# Patient Record
Sex: FEMALE | Race: WHITE | NOT HISPANIC OR LATINO | Employment: FULL TIME | URBAN - METROPOLITAN AREA
[De-identification: names, ages, dates, MRNs, and addresses within clinical notes are randomized per-mention and may not be internally consistent; named-entity substitution may affect disease eponyms.]

---

## 2018-03-14 ENCOUNTER — OFFICE VISIT (OUTPATIENT)
Dept: FAMILY MEDICINE CLINIC | Facility: CLINIC | Age: 28
End: 2018-03-14
Payer: COMMERCIAL

## 2018-03-14 VITALS
WEIGHT: 182 LBS | SYSTOLIC BLOOD PRESSURE: 124 MMHG | OXYGEN SATURATION: 98 % | RESPIRATION RATE: 16 BRPM | BODY MASS INDEX: 24.65 KG/M2 | DIASTOLIC BLOOD PRESSURE: 78 MMHG | HEIGHT: 72 IN | HEART RATE: 91 BPM

## 2018-03-14 DIAGNOSIS — H00.011 HORDEOLUM EXTERNUM OF RIGHT UPPER EYELID: ICD-10-CM

## 2018-03-14 DIAGNOSIS — M54.50 ACUTE RIGHT-SIDED LOW BACK PAIN WITHOUT SCIATICA: ICD-10-CM

## 2018-03-14 DIAGNOSIS — Z00.00 PREVENTATIVE HEALTH CARE: Primary | ICD-10-CM

## 2018-03-14 DIAGNOSIS — B00.9 HSV-2 INFECTION: ICD-10-CM

## 2018-03-14 PROCEDURE — 86580 TB INTRADERMAL TEST: CPT

## 2018-03-14 PROCEDURE — 99385 PREV VISIT NEW AGE 18-39: CPT | Performed by: FAMILY MEDICINE

## 2018-03-14 PROCEDURE — 99203 OFFICE O/P NEW LOW 30 MIN: CPT | Performed by: FAMILY MEDICINE

## 2018-03-14 RX ORDER — NAPROXEN 500 MG/1
500 TABLET ORAL EVERY 12 HOURS PRN
Refills: 0 | COMMUNITY
Start: 2018-01-25 | End: 2018-04-13 | Stop reason: ALTCHOICE

## 2018-03-14 RX ORDER — MELOXICAM 7.5 MG/1
7.5 TABLET ORAL DAILY
Refills: 0 | COMMUNITY
Start: 2018-02-12 | End: 2018-04-13 | Stop reason: ALTCHOICE

## 2018-03-14 RX ORDER — MOXIFLOXACIN 5 MG/ML
1 SOLUTION/ DROPS OPHTHALMIC 2 TIMES DAILY
Qty: 3 ML | Refills: 0 | Status: SHIPPED | OUTPATIENT
Start: 2018-03-14 | End: 2018-04-13 | Stop reason: ALTCHOICE

## 2018-03-14 RX ORDER — VALACYCLOVIR HYDROCHLORIDE 500 MG/1
500 TABLET, FILM COATED ORAL 2 TIMES DAILY
Qty: 30 TABLET | Refills: 0
Start: 2018-03-14 | End: 2018-08-28 | Stop reason: SDUPTHER

## 2018-03-14 RX ORDER — CELECOXIB 200 MG/1
200 CAPSULE ORAL 2 TIMES DAILY
Refills: 0 | COMMUNITY
Start: 2018-02-16 | End: 2018-04-13 | Stop reason: ALTCHOICE

## 2018-03-14 RX ORDER — CYCLOBENZAPRINE HCL 5 MG
TABLET ORAL
Refills: 0 | COMMUNITY
Start: 2018-02-16 | End: 2018-04-13

## 2018-03-14 NOTE — PROGRESS NOTES
Assessment/Plan:  Patient advised basic labs  She will call us back if she gets an active outbreak of HSV  Patient was provided a referral for physical therapy  She will contact her insurance regarding workmen's Comp  Diagnoses and all orders for this visit:    Preventative health care  -     TB Skin Test    Acute right-sided low back pain without sciatica  -     Ambulatory referral to Physical Therapy; Future    Hordeolum externum of right upper eyelid  -     moxifloxacin (VIGAMOX) 0 5 % ophthalmic solution; Administer 1 drop to the right eye 2 (two) times a day    HSV-2 infection  -     CBC and differential  -     Comprehensive metabolic panel  -     valACYclovir (VALTREX) 500 mg tablet; Take 1 tablet (500 mg total) by mouth 2 (two) times a day for 31 days  -     HSV 1/2 IgM and Type Specific IgG; Future          Subjective:      Patient ID: Anthony Kaplan is a 28 y o  female  Prior PCP: In [place]  Previous Dental Visit: Recent  Previous Eye Exam: recent    Prior Vaccines:   - Last Flu vaccine: 2017  - Last Tetanus vaccine: 2013    Diet: healthy  Exercise: Daily    LMP: 3/1/18  Duration: 4 days  Menarche: 12 yrs    Cancer screening  Last Pap: 6 months ago  Last Mammo: NA  Colonoscopy NA     HPI   Patient is here to establish care  She has recently moved from [place] and needs a physical for her job  Patient is requesting PPD vaccine today  She had a Fall on  1/25/18,  When she slipped on water  According to patient this was an 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  Patient states that she was evaluated by her PCP in [place] and advised physical therapy  Patient is requesting a referral for physical therapy today  Patient states that this is linked to her workmen's Comp so she will call the insurance to check with details of physical therapy  Patient states that she continues to experience pain in the right lower back   She also reports history of chronic at chest pain infection for which she has been on 500 milligram Valtrex maintenance dose  Patient states that whenever she gets the infection she has to take  Medication for at least a month  Patient states that she does not have any active infection  she is also reporting stye  Of her right eye  Patient states that she has used moxifloxacin  Eyedrops in the past for this and is requesting a prescription for same  The following portions of the patient's history were reviewed and updated as appropriate: allergies, current medications, past family history, past medical history, past social history, past surgical history and problem list     Review of Systems   Constitutional: Negative  HENT: Negative  Eyes: Positive for pain (Rt eye)  Respiratory: Negative  Cardiovascular: Negative  Gastrointestinal: Negative  Endocrine: Negative  Genitourinary: Negative  Musculoskeletal: Positive for back pain (  Right low back pain)  Skin: Negative  Neurological: Negative  Psychiatric/Behavioral: Negative  Social History     Social History    Marital status: /Civil Union     Spouse name: N/A    Number of children: N/A    Years of education: N/A     Occupational History    Not on file  Social History Main Topics    Smoking status: Never Smoker    Smokeless tobacco: Never Used    Alcohol use Yes    Drug use: Unknown    Sexual activity: Not on file     Other Topics Concern    Not on file     Social History Narrative    No narrative on file         Objective:  /78 (BP Location: Right arm, Patient Position: Sitting, Cuff Size: Standard)   Pulse 91   Resp 16   Ht 6' 7" (2 007 m)   Wt 82 6 kg (182 lb)   SpO2 98%   BMI 20 50 kg/m²      Physical Exam   Constitutional: She is oriented to person, place, and time  She appears well-developed and well-nourished  HENT:   Head: Normocephalic     Right Ear: External ear normal    Left Ear: External ear normal    Eyes: Pupils are equal, round, and reactive to light  Stye of right upper lid  Neck: Normal range of motion  Neck supple  Cardiovascular: Normal rate and regular rhythm  Pulmonary/Chest: Effort normal and breath sounds normal    Abdominal: Soft  Bowel sounds are normal    Musculoskeletal: Normal range of motion  Neurological: She is alert and oriented to person, place, and time  Psychiatric: She has a normal mood and affect   Her behavior is normal  Judgment normal

## 2018-03-16 ENCOUNTER — APPOINTMENT (OUTPATIENT)
Dept: LAB | Facility: CLINIC | Age: 28
End: 2018-03-16
Payer: COMMERCIAL

## 2018-03-16 DIAGNOSIS — B00.9 HSV-2 INFECTION: ICD-10-CM

## 2018-03-16 LAB
ALBUMIN SERPL BCP-MCNC: 3.9 G/DL (ref 3.5–5)
ALP SERPL-CCNC: 45 U/L (ref 46–116)
ALT SERPL W P-5'-P-CCNC: 20 U/L (ref 12–78)
ANION GAP SERPL CALCULATED.3IONS-SCNC: 7 MMOL/L (ref 4–13)
AST SERPL W P-5'-P-CCNC: 13 U/L (ref 5–45)
BASOPHILS # BLD AUTO: 0.07 THOUSANDS/ΜL (ref 0–0.1)
BASOPHILS NFR BLD AUTO: 2 % (ref 0–1)
BILIRUB SERPL-MCNC: 0.65 MG/DL (ref 0.2–1)
BUN SERPL-MCNC: 11 MG/DL (ref 5–25)
CALCIUM SERPL-MCNC: 8.9 MG/DL (ref 8.3–10.1)
CHLORIDE SERPL-SCNC: 103 MMOL/L (ref 100–108)
CO2 SERPL-SCNC: 28 MMOL/L (ref 21–32)
CREAT SERPL-MCNC: 0.75 MG/DL (ref 0.6–1.3)
EOSINOPHIL # BLD AUTO: 0.18 THOUSAND/ΜL (ref 0–0.61)
EOSINOPHIL NFR BLD AUTO: 4 % (ref 0–6)
ERYTHROCYTE [DISTWIDTH] IN BLOOD BY AUTOMATED COUNT: 12.4 % (ref 11.6–15.1)
GFR SERPL CREATININE-BSD FRML MDRD: 109 ML/MIN/1.73SQ M
GLUCOSE P FAST SERPL-MCNC: 88 MG/DL (ref 65–99)
HCT VFR BLD AUTO: 38.3 % (ref 34.8–46.1)
HGB BLD-MCNC: 13 G/DL (ref 11.5–15.4)
LYMPHOCYTES # BLD AUTO: 1.4 THOUSANDS/ΜL (ref 0.6–4.47)
LYMPHOCYTES NFR BLD AUTO: 32 % (ref 14–44)
MCH RBC QN AUTO: 29.9 PG (ref 26.8–34.3)
MCHC RBC AUTO-ENTMCNC: 33.9 G/DL (ref 31.4–37.4)
MCV RBC AUTO: 88 FL (ref 82–98)
MONOCYTES # BLD AUTO: 0.26 THOUSAND/ΜL (ref 0.17–1.22)
MONOCYTES NFR BLD AUTO: 6 % (ref 4–12)
NEUTROPHILS # BLD AUTO: 2.46 THOUSANDS/ΜL (ref 1.85–7.62)
NEUTS SEG NFR BLD AUTO: 56 % (ref 43–75)
NRBC BLD AUTO-RTO: 0 /100 WBCS
PLATELET # BLD AUTO: 225 THOUSANDS/UL (ref 149–390)
PMV BLD AUTO: 10.9 FL (ref 8.9–12.7)
POTASSIUM SERPL-SCNC: 4.1 MMOL/L (ref 3.5–5.3)
PROT SERPL-MCNC: 7.2 G/DL (ref 6.4–8.2)
RBC # BLD AUTO: 4.35 MILLION/UL (ref 3.81–5.12)
SODIUM SERPL-SCNC: 138 MMOL/L (ref 136–145)
WBC # BLD AUTO: 4.38 THOUSAND/UL (ref 4.31–10.16)

## 2018-03-16 PROCEDURE — 86695 HERPES SIMPLEX TYPE 1 TEST: CPT

## 2018-03-16 PROCEDURE — 86696 HERPES SIMPLEX TYPE 2 TEST: CPT

## 2018-03-16 PROCEDURE — 36415 COLL VENOUS BLD VENIPUNCTURE: CPT | Performed by: FAMILY MEDICINE

## 2018-03-16 PROCEDURE — 80053 COMPREHEN METABOLIC PANEL: CPT | Performed by: FAMILY MEDICINE

## 2018-03-16 PROCEDURE — 85025 COMPLETE CBC W/AUTO DIFF WBC: CPT | Performed by: FAMILY MEDICINE

## 2018-03-17 LAB
HSV1 IGG SER IA-ACNC: <0.91 INDEX (ref 0–0.9)
HSV2 IGG SER IA-ACNC: 9.22 INDEX (ref 0–0.9)

## 2018-03-19 LAB
HSV1 IGM TITR SER IF: NORMAL TITER
HSV2 IGM TITR SER IF: NORMAL TITER

## 2018-04-11 ENCOUNTER — TELEPHONE (OUTPATIENT)
Dept: FAMILY MEDICINE CLINIC | Facility: CLINIC | Age: 28
End: 2018-04-11

## 2018-04-11 NOTE — TELEPHONE ENCOUNTER
[facility] CALLED AND IS REQUESTING AN UPDATE ORDER FOR LUIS FERNANDO FOR LOWER BACK    PLEASE FAX -[phone]

## 2018-04-12 DIAGNOSIS — M54.50 ACUTE RIGHT-SIDED LOW BACK PAIN WITHOUT SCIATICA: Primary | ICD-10-CM

## 2018-04-13 ENCOUNTER — OFFICE VISIT (OUTPATIENT)
Dept: FAMILY MEDICINE CLINIC | Facility: CLINIC | Age: 28
End: 2018-04-13
Payer: OTHER MISCELLANEOUS

## 2018-04-13 VITALS
WEIGHT: 181 LBS | BODY MASS INDEX: 28.41 KG/M2 | RESPIRATION RATE: 16 BRPM | DIASTOLIC BLOOD PRESSURE: 76 MMHG | HEART RATE: 82 BPM | HEIGHT: 67 IN | OXYGEN SATURATION: 98 % | SYSTOLIC BLOOD PRESSURE: 122 MMHG

## 2018-04-13 DIAGNOSIS — M54.42 CHRONIC BILATERAL LOW BACK PAIN WITH LEFT-SIDED SCIATICA: Primary | ICD-10-CM

## 2018-04-13 DIAGNOSIS — M62.838 MUSCLE SPASM: ICD-10-CM

## 2018-04-13 DIAGNOSIS — G89.29 CHRONIC BILATERAL LOW BACK PAIN WITH LEFT-SIDED SCIATICA: Primary | ICD-10-CM

## 2018-04-13 PROBLEM — M54.50 CHRONIC BILATERAL LOW BACK PAIN WITHOUT SCIATICA: Status: ACTIVE | Noted: 2018-04-13

## 2018-04-13 PROCEDURE — 99214 OFFICE O/P EST MOD 30 MIN: CPT | Performed by: FAMILY MEDICINE

## 2018-04-13 RX ORDER — PREDNISONE 20 MG/1
60 TABLET ORAL DAILY
Qty: 15 TABLET | Refills: 0 | Status: SHIPPED | OUTPATIENT
Start: 2018-04-13 | End: 2018-04-18

## 2018-04-13 RX ORDER — CYCLOBENZAPRINE HCL 10 MG
10 TABLET ORAL 3 TIMES DAILY PRN
Qty: 30 TABLET | Refills: 0 | Status: SHIPPED | OUTPATIENT
Start: 2018-04-13 | End: 2018-05-04

## 2018-04-13 NOTE — PROGRESS NOTES
Subjective:      Patient ID: Kyra Ba is a 28 y o  female  Back Pain   This is a chronic problem  The current episode started in the past 7 days  The problem occurs constantly  The problem has been gradually worsening since onset  The pain is present in the lumbar spine and sacro-iliac  The quality of the pain is described as aching, cramping and shooting  The pain radiates to the right thigh and left thigh  The pain is at a severity of 7/10  The pain is moderate  The pain is the same all the time  The symptoms are aggravated by bending, lying down, position, sitting, standing and twisting  Stiffness is present all day  Associated symptoms include leg pain  Pertinent negatives include no bladder incontinence, bowel incontinence, dysuria, fever, numbness, paresis, paresthesias, pelvic pain, tingling or weakness  Patient's symptoms have been going on since of Fall  in Jan 25 2018, Accidental slip on wet floor, at work, in [place]  Pt involved in [employer] which is in [place]  She moved to area in march 2018  Has been doing Physical [facility]  Patient was at her therapy session yesterday for evaluation for [email or web address]  After the physical therapy she experienced worsening low back pain,  Which is now radiating up to the middle of both her thighs  No past medical history on file  No family history on file  No past surgical history on file  reports that she has never smoked  She has never used smokeless tobacco  She reports that she drinks alcohol        Current Outpatient Prescriptions:     valACYclovir (VALTREX) 500 mg tablet, Take 1 tablet (500 mg total) by mouth 2 (two) times a day for 31 days, Disp: 30 tablet, Rfl: 0    cyclobenzaprine (FLEXERIL) 10 mg tablet, Take 1 tablet (10 mg total) by mouth 3 (three) times a day as needed for muscle spasms, Disp: 30 tablet, Rfl: 0    diclofenac sodium (VOLTAREN) 50 mg EC tablet, Take 1 tablet (50 mg total) by mouth 2 (two) times a day, Disp: 60 tablet, Rfl: 0    predniSONE 20 mg tablet, Take 3 tablets (60 mg total) by mouth daily for 5 days, Disp: 15 tablet, Rfl: 0    The following portions of the patient's history were reviewed and updated as appropriate: allergies, current medications, past family history, past medical history, past social history, past surgical history and problem list     Review of Systems   Constitutional: Negative for fever  Gastrointestinal: Negative for bowel incontinence  Genitourinary: Negative for bladder incontinence, dysuria and pelvic pain  Musculoskeletal: Positive for back pain  Negative for arthralgias and gait problem  Neurological: Negative for tingling, weakness, numbness and paresthesias  Objective:    /76   Pulse 82   Resp 16   Ht 5' 7" (1 702 m)   Wt 82 1 kg (181 lb)   LMP 04/01/2018   SpO2 98%   BMI 28 35 kg/m²      Physical Exam   Constitutional: She is oriented to person, place, and time  She appears well-developed and well-nourished  Cardiovascular: Normal rate, regular rhythm and normal heart sounds  Pulmonary/Chest: Effort normal and breath sounds normal    Abdominal: Soft  Bowel sounds are normal    Musculoskeletal: She exhibits tenderness ( over right Lumbar paraspinal region  )  Neurological: She is alert and oriented to person, place, and time  She has normal reflexes  She displays normal reflexes  No cranial nerve deficit  She exhibits abnormal muscle tone   Coordination normal    Psychiatric: Her behavior is normal  Judgment normal          Recent Results (from the past 1008 hour(s))   CBC and differential    Collection Time: 03/16/18  9:29 AM   Result Value Ref Range    WBC 4 38 4 31 - 10 16 Thousand/uL    RBC 4 35 3 81 - 5 12 Million/uL    Hemoglobin 13 0 11 5 - 15 4 g/dL    Hematocrit 38 3 34 8 - 46 1 %    MCV 88 82 - 98 fL    MCH 29 9 26 8 - 34 3 pg    MCHC 33 9 31 4 - 37 4 g/dL    RDW 12 4 11 6 - 15 1 %    MPV 10 9 8 9 - 12 7 fL    Platelets 634 298 - 750 Thousands/uL    nRBC 0 /100 WBCs    Neutrophils Relative 56 43 - 75 %    Lymphocytes Relative 32 14 - 44 %    Monocytes Relative 6 4 - 12 %    Eosinophils Relative 4 0 - 6 %    Basophils Relative 2 (H) 0 - 1 %    Neutrophils Absolute 2 46 1 85 - 7 62 Thousands/µL    Lymphocytes Absolute 1 40 0 60 - 4 47 Thousands/µL    Monocytes Absolute 0 26 0 17 - 1 22 Thousand/µL    Eosinophils Absolute 0 18 0 00 - 0 61 Thousand/µL    Basophils Absolute 0 07 0 00 - 0 10 Thousands/µL   Comprehensive metabolic panel    Collection Time: 03/16/18  9:29 AM   Result Value Ref Range    Sodium 138 136 - 145 mmol/L    Potassium 4 1 3 5 - 5 3 mmol/L    Chloride 103 100 - 108 mmol/L    CO2 28 21 - 32 mmol/L    Anion Gap 7 4 - 13 mmol/L    BUN 11 5 - 25 mg/dL    Creatinine 0 75 0 60 - 1 30 mg/dL    Glucose, Fasting 88 65 - 99 mg/dL    Calcium 8 9 8 3 - 10 1 mg/dL    AST 13 5 - 45 U/L    ALT 20 12 - 78 U/L    Alkaline Phosphatase 45 (L) 46 - 116 U/L    Total Protein 7 2 6 4 - 8 2 g/dL    Albumin 3 9 3 5 - 5 0 g/dL    Total Bilirubin 0 65 0 20 - 1 00 mg/dL    eGFR 109 ml/min/1 73sq m   Herpes I /II IgM Ab Indirect    Collection Time: 03/16/18  6:32 PM   Result Value Ref Range    HSV 1 IgM <1:10 <1:10 titer    HSV 2 IgM <1:10 <1:10 titer   Herpes I/II IgG Antibodies    Collection Time: 03/16/18  6:32 PM   Result Value Ref Range    HSV 1 IgG <0 91 0 00 - 0 90 index    HSV 2 IgG 9 22 (H) 0 00 - 0 90 index       Assessment/Plan:   worsening low back pain with intense muscular spasm of the right paraspinal lumbar region  According to patient symptoms worsened after she was evaluated by Physical therapy  Patient states that this chills symptoms have been going on for a long time,  And this is affecting her in many ways  Patient started on anti-inflammatory and muscle relaxants  She was provided a referral for physical therapy and also for pain management     Diagnoses and all orders for this visit:    Chronic bilateral low back pain with left-sided sciatica  -     diclofenac sodium (VOLTAREN) 50 mg EC tablet; Take 1 tablet (50 mg total) by mouth 2 (two) times a day  -     cyclobenzaprine (FLEXERIL) 10 mg tablet; Take 1 tablet (10 mg total) by mouth 3 (three) times a day as needed for muscle spasms  -     Ambulatory referral to Pain Management; Future  -     predniSONE 20 mg tablet; Take 3 tablets (60 mg total) by mouth daily for 5 days  -     Ambulatory referral to Physical Therapy; Future    Muscle spasm  -     diclofenac sodium (VOLTAREN) 50 mg EC tablet; Take 1 tablet (50 mg total) by mouth 2 (two) times a day  -     cyclobenzaprine (FLEXERIL) 10 mg tablet; Take 1 tablet (10 mg total) by mouth 3 (three) times a day as needed for muscle spasms  -     Ambulatory referral to Pain Management; Future  -     predniSONE 20 mg tablet; Take 3 tablets (60 mg total) by mouth daily for 5 days  -     Ambulatory referral to Physical Therapy;  Future

## 2018-04-17 ENCOUNTER — TELEPHONE (OUTPATIENT)
Dept: FAMILY MEDICINE CLINIC | Facility: CLINIC | Age: 28
End: 2018-04-17

## 2018-04-17 NOTE — TELEPHONE ENCOUNTER
Physician with Patient's workmen Comp called denying physical therapy which was ordered for patient at her most recent office visit  According to the  Physician patient was approved for are 12 physical therapy sessions for the fall that she sustained at work in [place]  She has completed 10 sessions already  He reviewed physical therapy notes and does not feel necessary for her to do 12 more physical therapy sessions at this point of time  I did make him aware that patient has recently moved to this area and when she went for physical therapy due to manipulation that therapy she experienced acute muscle spasm of the lower back  She was treated for her symptoms and advised physical therapy if the symptoms persist or worsen  The insurance provider however feels that at this point if she is not improving from therapy that she should do home exercises for her symptoms  I have referred the patient to consult pain specialist to see if there is something that needs to be done besides physical therapy for her ongoing symptoms  Please call the patient and  Inform her

## 2018-05-04 ENCOUNTER — OFFICE VISIT (OUTPATIENT)
Dept: FAMILY MEDICINE CLINIC | Facility: CLINIC | Age: 28
End: 2018-05-04
Payer: COMMERCIAL

## 2018-05-04 VITALS
OXYGEN SATURATION: 98 % | SYSTOLIC BLOOD PRESSURE: 120 MMHG | DIASTOLIC BLOOD PRESSURE: 72 MMHG | RESPIRATION RATE: 18 BRPM | BODY MASS INDEX: 29.51 KG/M2 | HEIGHT: 67 IN | TEMPERATURE: 98.4 F | WEIGHT: 188 LBS | HEART RATE: 94 BPM

## 2018-05-04 DIAGNOSIS — L01.00 IMPETIGO: Primary | ICD-10-CM

## 2018-05-04 PROCEDURE — 1036F TOBACCO NON-USER: CPT | Performed by: PHYSICIAN ASSISTANT

## 2018-05-04 PROCEDURE — 99213 OFFICE O/P EST LOW 20 MIN: CPT | Performed by: PHYSICIAN ASSISTANT

## 2018-05-04 RX ORDER — MELOXICAM 15 MG/1
TABLET ORAL
COMMUNITY
Start: 2018-04-27 | End: 2018-12-12

## 2018-05-04 RX ORDER — CEPHALEXIN 250 MG/1
250 CAPSULE ORAL 4 TIMES DAILY
Qty: 40 CAPSULE | Refills: 0 | Status: SHIPPED | OUTPATIENT
Start: 2018-05-04 | End: 2018-05-14

## 2018-05-04 RX ORDER — TRAMADOL HYDROCHLORIDE 50 MG/1
TABLET ORAL
COMMUNITY
End: 2018-12-12

## 2018-05-04 NOTE — PROGRESS NOTES
Assessment/Plan:     Diagnoses and all orders for this visit:    Impetigo  Honey colored crusting, with limited spread on forehead Spares periorbital region   - Started on mupirocin, Apply topically 3 (three) times a day  - FU in 3 days if no improvement  - Directed to clean minor injuries with soap and water and regular handwashing          Subjective:    Patient ID: Chalo Sandy is a 28 y o  female  Pt is presenting today for Itchy crusting forehead rash starting 7 days ago  Initially she attempted to "pop a zit" and the it because red and started to ooze a yellowish crust  In the past 3 days it has started to spread up her forehead  She has been using antibiotic cream with on impact  She has a history of impetigo infection on her chin 10 years ago  She states it looks very similar and was treated with antibiotic cream and oral antibiotics  Denies fever, chills, blurry vision or headache  She has some seasonal allergies and occasionally uses Claritin for relief  Rash   This is a new problem  The current episode started in the past 7 days  The problem has been gradually worsening since onset  The affected locations include the face  The rash is characterized by itchiness and pain  She was exposed to nothing  Pertinent negatives include no congestion, cough, diarrhea, eye pain, facial edema, fatigue, fever, rhinorrhea, shortness of breath or sore throat  Past treatments include antibiotic cream  The treatment provided no relief  There is no history of allergies, asthma, eczema or varicella  The following portions of the patient's history were reviewed and updated as appropriate: allergies, current medications and problem list     Review of Systems   Constitutional: Negative for fatigue, fever and unexpected weight change  HENT: Negative for congestion, rhinorrhea and sore throat  Eyes: Negative for pain  Respiratory: Negative for cough, shortness of breath and wheezing  Cardiovascular: Negative for chest pain, palpitations and leg swelling  Gastrointestinal: Negative for constipation, diarrhea and nausea  Musculoskeletal: Negative for arthralgias and myalgias  Skin: Positive for rash  Negative for pallor  Objective:  /72   Pulse 94   Temp 98 4 °F (36 9 °C)   Resp 18   Ht 5' 7" (1 702 m)   Wt 85 3 kg (188 lb)   SpO2 98%   BMI 29 44 kg/m²      Physical Exam   Constitutional: She is oriented to person, place, and time  She appears well-developed and well-nourished  No distress  HENT:   Head: Normocephalic and atraumatic  Right Ear: External ear normal    Left Ear: External ear normal    Mouth/Throat: Oropharynx is clear and moist  No oropharyngeal exudate  Eyes: Pupils are equal, round, and reactive to light  Neck: Normal range of motion  Neck supple  Cardiovascular: Normal rate, regular rhythm, normal heart sounds and intact distal pulses  Exam reveals no gallop and no friction rub  No murmur heard  Pulmonary/Chest: Effort normal and breath sounds normal  No respiratory distress  She has no wheezes  She has no rales  Lymphadenopathy:     She has no cervical adenopathy  Neurological: She is alert and oriented to person, place, and time  Skin: Rash noted  No purpura noted  Rash is maculopapular  Rash is not papular, not nodular, not pustular, not vesicular and not urticarial  She is not diaphoretic

## 2018-08-28 ENCOUNTER — OFFICE VISIT (OUTPATIENT)
Dept: FAMILY MEDICINE CLINIC | Facility: CLINIC | Age: 28
End: 2018-08-28
Payer: COMMERCIAL

## 2018-08-28 VITALS
SYSTOLIC BLOOD PRESSURE: 120 MMHG | RESPIRATION RATE: 16 BRPM | OXYGEN SATURATION: 98 % | HEART RATE: 86 BPM | DIASTOLIC BLOOD PRESSURE: 70 MMHG | WEIGHT: 184 LBS | HEIGHT: 67 IN | BODY MASS INDEX: 28.88 KG/M2

## 2018-08-28 DIAGNOSIS — B00.9 HSV-2 INFECTION: ICD-10-CM

## 2018-08-28 DIAGNOSIS — R21 RASH, SKIN: Primary | ICD-10-CM

## 2018-08-28 PROCEDURE — 3008F BODY MASS INDEX DOCD: CPT | Performed by: FAMILY MEDICINE

## 2018-08-28 PROCEDURE — 99213 OFFICE O/P EST LOW 20 MIN: CPT | Performed by: FAMILY MEDICINE

## 2018-08-28 RX ORDER — VALACYCLOVIR HYDROCHLORIDE 500 MG/1
500 TABLET, FILM COATED ORAL 2 TIMES DAILY
Qty: 30 TABLET | Refills: 0
Start: 2018-08-28 | End: 2018-12-12

## 2018-08-29 NOTE — ASSESSMENT & PLAN NOTE
No active infection which she wants the refill on will cycle over she use only when she gets any episode of herpes

## 2018-08-29 NOTE — PROGRESS NOTES
Assessment/Plan:    Problem List Items Addressed This Visit        Musculoskeletal and Integument    Rash, skin - Primary     Possible eczema, does not look like a Tinea  and did not respond to anti fungal         Relevant Medications    triamcinolone (KENALOG) 0 1 % ointment       Other    HSV-2 infection     No active infection which she wants the refill on will cycle over she use only when she gets any episode of herpes         Relevant Medications    valACYclovir (VALTREX) 500 mg tablet          Chief Complaint   Patient presents with    Rash       Subjective:   Patient ID: Stefano Pittman is a 28 y o  female  She is here for a new symptom a getting rash on her body started with 1 spot at the front of the neck and now she has multiple spots on front of her body and few on the back there skin color slightly raised and itchy for the last 2 weeks she has no sick contact she has no fever chills URI symptoms  She tried antifungal cream which did not improve the symptoms, she also feels it is dry and slightly flaky,  She also wants to get refill on Valtrex which she gets only when she has episode of herpes        Review of Systems   Constitutional: Negative for activity change, appetite change, chills, diaphoresis, fatigue, fever and unexpected weight change  HENT: Negative for congestion, dental problem, ear discharge, ear pain, facial swelling, hearing loss, mouth sores, nosebleeds, postnasal drip, rhinorrhea, sinus pain, sinus pressure, sneezing, sore throat, trouble swallowing and voice change  Eyes: Negative for photophobia, pain, discharge, redness and itching  Respiratory: Negative for cough, chest tightness, shortness of breath and wheezing  Cardiovascular: Negative for chest pain, palpitations and leg swelling  Gastrointestinal: Negative for abdominal distention, abdominal pain, blood in stool, constipation, diarrhea and nausea     Endocrine: Negative for cold intolerance, heat intolerance, polydipsia, polyphagia and polyuria  Genitourinary: Negative for dysuria, flank pain, frequency, hematuria and urgency  Musculoskeletal: Negative for arthralgias, back pain, myalgias and neck pain  Skin: Positive for rash  Negative for color change and pallor  Allergic/Immunologic: Negative for environmental allergies and food allergies  Neurological: Negative for dizziness, weakness, light-headedness, numbness and headaches  Hematological: Negative for adenopathy  Does not bruise/bleed easily  Psychiatric/Behavioral: Negative for behavioral problems, sleep disturbance and suicidal ideas  The patient is not nervous/anxious  Objective:  Physical Exam   Constitutional: She appears well-developed and well-nourished  HENT:   Head: Normocephalic and atraumatic  Mouth/Throat: Oropharynx is clear and moist    Eyes: Conjunctivae and EOM are normal  Pupils are equal, round, and reactive to light  No scleral icterus  Neck: Normal range of motion  Neck supple  No thyromegaly present  Cardiovascular: Normal rate, regular rhythm and normal heart sounds  Pulmonary/Chest: Effort normal and breath sounds normal  She has no wheezes  She has no rales  Lymphadenopathy:     She has no cervical adenopathy  Neurological: She is alert  Skin: Rash noted  No erythema  Slightly raised ,1 cm skin color spots , 5- 8 noted on neck, chest , back , dry , rough    Psychiatric: She has a normal mood and affect  Nursing note and vitals reviewed             Current Outpatient Prescriptions:     meloxicam (MOBIC) 15 mg tablet, , Disp: , Rfl:     mupirocin (BACTROBAN) 2 % ointment, Apply topically 3 (three) times a day (Patient not taking: Reported on 8/28/2018 ), Disp: 22 g, Rfl: 0    traMADol (ULTRAM) 50 mg tablet, tramadol 50 mg tablet, Disp: , Rfl:     triamcinolone (KENALOG) 0 1 % ointment, Apply topically 2 (two) times a day, Disp: 30 g, Rfl: 0    valACYclovir (VALTREX) 500 mg tablet, Take 1 tablet (500 mg total) by mouth 2 (two) times a day for 31 days, Disp: 30 tablet, Rfl: 0    No Known Allergies    Vitals:    08/28/18 1944   BP: 120/70   Pulse: 86   Resp: 16   SpO2: 98%   Weight: 83 5 kg (184 lb)   Height: 5' 7" (1 702 m)

## 2018-12-12 ENCOUNTER — OFFICE VISIT (OUTPATIENT)
Dept: FAMILY MEDICINE CLINIC | Facility: CLINIC | Age: 28
End: 2018-12-12
Payer: COMMERCIAL

## 2018-12-12 VITALS
BODY MASS INDEX: 31.39 KG/M2 | WEIGHT: 200 LBS | DIASTOLIC BLOOD PRESSURE: 70 MMHG | RESPIRATION RATE: 16 BRPM | HEIGHT: 67 IN | OXYGEN SATURATION: 99 % | HEART RATE: 79 BPM | SYSTOLIC BLOOD PRESSURE: 120 MMHG

## 2018-12-12 DIAGNOSIS — L08.9 SKIN INFECTION: Primary | ICD-10-CM

## 2018-12-12 PROCEDURE — 99213 OFFICE O/P EST LOW 20 MIN: CPT | Performed by: FAMILY MEDICINE

## 2018-12-12 PROCEDURE — 3008F BODY MASS INDEX DOCD: CPT | Performed by: FAMILY MEDICINE

## 2018-12-12 RX ORDER — CEPHALEXIN 500 MG/1
500 CAPSULE ORAL EVERY 12 HOURS SCHEDULED
Qty: 14 CAPSULE | Refills: 0 | Status: SHIPPED | OUTPATIENT
Start: 2018-12-12 | End: 2018-12-19

## 2018-12-12 NOTE — PROGRESS NOTES
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)   - denies N/V/pain/tender to touch, feels like has a "cut that won't heal"  - denies having a pimple that she popped   - sister who is in RN school thought it was ringworm and pt tired Monostat on it w/o relief   - also tired antibiotic ointment without relief       The following portions of the patient's history were reviewed and updated as appropriate: allergies, current medications, past family history, past medical history, past social history, past surgical history and problem list     Review of Systems  as per HPI    Objective:  /70   Pulse 79   Resp 16   Ht 5' 7" (1 702 m)   Wt 90 7 kg (200 lb)   SpO2 99%   BMI 31 32 kg/m²    Physical Exam   Constitutional: She is oriented to person, place, and time  She appears well-developed and well-nourished  No distress  HENT:   Head: Normocephalic and atraumatic  Right Ear: External ear normal    Left Ear: External ear normal    Nose: Nose normal    Eyes: Conjunctivae and EOM are normal  Right eye exhibits no discharge  Left eye exhibits no discharge  No scleral icterus  Neck: Normal range of motion  Musculoskeletal: Normal range of motion  She exhibits no deformity  Neurological: She is alert and oriented to person, place, and time  Skin: Skin is warm   She is not diaphoretic    (+) 1cm skin lesion with yellow-green discharge, not hot/indurated or TTP    Psychiatric: She has a normal mood and affect  Her behavior is normal    Vitals reviewed

## 2019-01-14 ENCOUNTER — TELEPHONE (OUTPATIENT)
Dept: FAMILY MEDICINE CLINIC | Facility: CLINIC | Age: 29
End: 2019-01-14

## 2019-09-10 ENCOUNTER — VBI (OUTPATIENT)
Dept: ADMINISTRATIVE | Facility: OTHER | Age: 29
End: 2019-09-10

## 2019-09-10 NOTE — TELEPHONE ENCOUNTER
Roger Hancockjanell    ED Visit Information     Ed visit date: 8/26/19  Diagnosis Description: MYDRIASIS  In Network? No  Discharge status: Home  Discharged with meds ? No  Number of ED visits to date: 1  ED Severity:3     Outreach Information    Outreach successful: Yes 1  Date letter mailed:0  Date Finalized:9/10/19    Care Coordination    Follow up appointment with pcp: no pt changed out of network phsy now  Transportation issues ? NA    [facility] [employer] type:  7 Day Outreach  Emergent necessity warranted by diagnosis:  Yes  [facility] PCP:  Yes  Transportation:  Friend/Family Transport  Practice Contacted Patient ?:  No  Pt had ED follow up with pcp/staff ?:  No    Seen for follow-up out of network ?:  Yes  Reason Pt went out of  network ?:  Transferred out of network, Preference  Reason Patient went to ED instead of Urgent Care or PCP?:  Perceived Severity of Illness  9/10/19 I spoke to [place] she stated she is no longer with [facility]-  She stated there was always a billing issue   She now sees someone through [facility]  I apologized for her experience and that was the  End of conversation